# Patient Record
Sex: FEMALE | Race: WHITE | NOT HISPANIC OR LATINO | URBAN - METROPOLITAN AREA
[De-identification: names, ages, dates, MRNs, and addresses within clinical notes are randomized per-mention and may not be internally consistent; named-entity substitution may affect disease eponyms.]

---

## 2020-10-28 ENCOUNTER — OFFICE VISIT (OUTPATIENT)
Dept: URBAN - METROPOLITAN AREA CLINIC 41 | Facility: CLINIC | Age: 85
End: 2020-10-28
Payer: MEDICARE

## 2020-10-28 PROCEDURE — 67028 INJECTION EYE DRUG: CPT | Performed by: OPHTHALMOLOGY

## 2020-10-28 PROCEDURE — 92134 CPTRZ OPH DX IMG PST SGM RTA: CPT | Performed by: OPHTHALMOLOGY

## 2020-10-28 ASSESSMENT — INTRAOCULAR PRESSURE
OD: 10
OS: 9

## 2020-12-09 ENCOUNTER — OFFICE VISIT (OUTPATIENT)
Dept: URBAN - METROPOLITAN AREA CLINIC 41 | Facility: CLINIC | Age: 85
End: 2020-12-09
Payer: MEDICARE

## 2020-12-09 PROCEDURE — 67028 INJECTION EYE DRUG: CPT | Performed by: OPHTHALMOLOGY

## 2020-12-09 PROCEDURE — 92134 CPTRZ OPH DX IMG PST SGM RTA: CPT | Performed by: OPHTHALMOLOGY

## 2020-12-09 ASSESSMENT — INTRAOCULAR PRESSURE
OD: 18
OS: 13

## 2020-12-09 NOTE — IMPRESSION/PLAN
Impression: Exudative age-related macular degeneration, right eye, with active choroidal neovascularization: H35.3211. OD.
s/p Lucentis x 27, last OD 10/28/2020
s/p Avastin OD, last 11/2/17
last DFE OU 8/5/2020 Plan: Exam and OCT reveal stable SRF/PED s/p Lucentis 6 weeks ago. History of worsening at 7 weeks. I have recommended continuing Lucentis q6 weeks to maintain vision in better seeing eye. The diagnosis, natural history, and prognosis of exudative AMD, as well as the R/B/A of laser, PDT, and anti-VEGF were discussed. The patient understands that treatment may not improve vision but should reduce the risk of further visual loss. The patient elects to proceed with intravitreal Lucentis OD, which was performed in the office per protocol without complication.  

RTC 6 weeks, OCT OU re-eval, Lucentis, DFE OU (semiannual)

## 2021-01-20 ENCOUNTER — OFFICE VISIT (OUTPATIENT)
Dept: URBAN - METROPOLITAN AREA CLINIC 41 | Facility: CLINIC | Age: 86
End: 2021-01-20
Payer: MEDICARE

## 2021-01-20 DIAGNOSIS — H35.3211 EXUDATIVE AGE-RELATED MACULAR DEGENERATION, RIGHT EYE, WITH ACTIVE CHOROIDAL NEOVASCULARIZATION: Primary | ICD-10-CM

## 2021-01-20 PROCEDURE — 92134 CPTRZ OPH DX IMG PST SGM RTA: CPT | Performed by: OPHTHALMOLOGY

## 2021-01-20 PROCEDURE — 67028 INJECTION EYE DRUG: CPT | Performed by: OPHTHALMOLOGY

## 2021-01-20 PROCEDURE — 92014 COMPRE OPH EXAM EST PT 1/>: CPT | Performed by: OPHTHALMOLOGY

## 2021-01-20 ASSESSMENT — INTRAOCULAR PRESSURE
OD: 13
OS: 12

## 2021-01-20 NOTE — IMPRESSION/PLAN
Impression: Type 2 diabetes mellitus without complications: R80.1. OU. Plan: No evidence of retinopathy OU. The patient was advised to maintain tight BS, BP, and lipid control.  Last A1C unknown, Ok

## 2021-01-20 NOTE — IMPRESSION/PLAN
Impression: Exudative age-related macular degeneration, right eye, with active choroidal neovascularization: H35.3211. OD.
s/p Lucentis x 28, last OD 12/9/2020
s/p Avastin OD, last 11/2/17
last DFE OU 8/5/2020 Plan: Both eyes are due for full dilated semiannual exam today. Exam and OCT demonstrates stable SRF and PED s/p Lucentis 6 weeks ago. History of worsening at 7 weeks. I have recommended continuing Lucentis q6 weeks to maintain vision in the better eye. The diagnosis, natural history, and prognosis of exudative AMD, as well as the R/B/A of laser, PDT, and anti-VEGF were discussed. The patient understands that treatment may not improve vision but should reduce the risk of further visual loss. The patient elects to proceed with intravitreal Lucentis OD, which was performed in the office per protocol without complication.  

RTC 6 weeks, OCT OU re-eval, Lucentis 0.5

## 2021-01-20 NOTE — IMPRESSION/PLAN
Impression: Nexdtve age-related mclr degn, left eye, intermed dry stage: H35.3122. OS. Plan: Exam remains stable without evidence of progression, and the OCT does not show evidence of IRF/SRF/SRH. The patient was advised to continue AREDS-2 multivitamins. The patient was also asked to continue Amsler monitoring, avoid smoking, wear UV protection, and call us immediately with any visual changes.

## 2021-01-20 NOTE — IMPRESSION/PLAN
Impression: Corneal scar: H17.9. OS. Plan: h/o herpetic keratitis OS. Inactive.   Followed by Dr. Sin Brooks

## 2021-03-03 ENCOUNTER — OFFICE VISIT (OUTPATIENT)
Dept: URBAN - METROPOLITAN AREA CLINIC 41 | Facility: CLINIC | Age: 86
End: 2021-03-03
Payer: MEDICARE

## 2021-03-03 PROCEDURE — 67028 INJECTION EYE DRUG: CPT | Performed by: OPHTHALMOLOGY

## 2021-03-03 PROCEDURE — 92134 CPTRZ OPH DX IMG PST SGM RTA: CPT | Performed by: OPHTHALMOLOGY

## 2021-03-03 ASSESSMENT — INTRAOCULAR PRESSURE
OS: 10
OD: 12

## 2021-04-14 ENCOUNTER — OFFICE VISIT (OUTPATIENT)
Dept: URBAN - METROPOLITAN AREA CLINIC 41 | Facility: CLINIC | Age: 86
End: 2021-04-14
Payer: MEDICARE

## 2021-04-14 PROCEDURE — 67028 INJECTION EYE DRUG: CPT | Performed by: OPHTHALMOLOGY

## 2021-04-14 PROCEDURE — 92134 CPTRZ OPH DX IMG PST SGM RTA: CPT | Performed by: OPHTHALMOLOGY

## 2021-04-14 ASSESSMENT — INTRAOCULAR PRESSURE
OD: 15
OS: 10

## 2021-04-14 NOTE — IMPRESSION/PLAN
Impression: Exudative age-related macular degeneration, right eye, with active choroidal neovascularization: H35.3211. OD.
s/p Lucentis x 30, last OD 3/3/21
s/p Avastin OD, last 11/2/17
last DFE OU 1/20/21 Plan: Exam and OCT reveal moderate SRF, slightly worse s/p Lucentis 6 weeks ago. Stable PED. History of worsening at 7 weeks. I have recommended continuing Lucentis q6 weeks to maintain vision in her  better eye. The diagnosis, natural history, and prognosis of exudative AMD, as well as the R/B/A of laser, PDT, and anti-VEGF were discussed. The patient understands that treatment may not improve vision but should reduce the risk of further visual loss. The patient elects to proceed with intravitreal Lucentis OD, which was performed in the office per protocol without complication.  

RTC 6 weeks, OCT OU re-eval, Lucentis 0.5

## 2021-05-26 ENCOUNTER — OFFICE VISIT (OUTPATIENT)
Dept: URBAN - METROPOLITAN AREA CLINIC 41 | Facility: CLINIC | Age: 86
End: 2021-05-26
Payer: MEDICARE

## 2021-05-26 PROCEDURE — 92134 CPTRZ OPH DX IMG PST SGM RTA: CPT | Performed by: OPHTHALMOLOGY

## 2021-05-26 PROCEDURE — 67028 INJECTION EYE DRUG: CPT | Performed by: OPHTHALMOLOGY

## 2021-05-26 ASSESSMENT — INTRAOCULAR PRESSURE
OD: 12
OS: 16

## 2021-05-26 NOTE — IMPRESSION/PLAN
Impression: Exudative age-related macular degeneration, right eye, with active choroidal neovascularization: H35.3211. OD.
s/p Lucentis x 31, last OD 4/14/21
s/p Avastin OD, last 11/2/17
last DFE OU 1/20/21 Plan: Exam and OCT reveal moderate SRF, improved s/p Lucentis 6 weeks ago. Stable PED. History of worsening at 7 weeks. I have recommended continuing Lucentis q6 weeks to maintain vision in her  better eye. The diagnosis, natural history, and prognosis of exudative AMD, as well as the R/B/A of laser, PDT, and anti-VEGF were discussed. The patient understands that treatment may not improve vision but should reduce the risk of further visual loss. The patient elects to proceed with intravitreal Lucentis OD, which was performed in the office per protocol without complication.  

RTC 6 weeks, OCT OU re-eval, Lucentis 0.5, dilate OD only (per patient request)

## 2021-07-07 ENCOUNTER — OFFICE VISIT (OUTPATIENT)
Dept: URBAN - METROPOLITAN AREA CLINIC 41 | Facility: CLINIC | Age: 86
End: 2021-07-07
Payer: MEDICARE

## 2021-07-07 DIAGNOSIS — Z96.1 PRESENCE OF INTRAOCULAR LENS: ICD-10-CM

## 2021-07-07 DIAGNOSIS — E11.9 TYPE 2 DIABETES MELLITUS WITHOUT COMPLICATIONS: ICD-10-CM

## 2021-07-07 PROCEDURE — 92014 COMPRE OPH EXAM EST PT 1/>: CPT | Performed by: OPHTHALMOLOGY

## 2021-07-07 PROCEDURE — 67028 INJECTION EYE DRUG: CPT | Performed by: OPHTHALMOLOGY

## 2021-07-07 PROCEDURE — 92134 CPTRZ OPH DX IMG PST SGM RTA: CPT | Performed by: OPHTHALMOLOGY

## 2021-07-07 ASSESSMENT — INTRAOCULAR PRESSURE
OS: 14
OD: 13

## 2021-07-07 NOTE — IMPRESSION/PLAN
Impression: Exudative age-related macular degeneration, right eye, with active choroidal neovascularization: H35.3211. OD.
s/p Lucentis x 32, last OD 5/26/21
s/p Avastin OD, last 11/2/17
last DFE OU 1/20/21 Plan: The right eye is due for dilated semiannual exam today. Exam and OCT reveal persistent, moderate SRF, slightly increased s/p Lucentis 6 weeks ago. Stable PED. History of worsening at 7 weeks. I have recommended continuing Lucentis q6 weeks to maintain vision in her  better eye. The diagnosis, natural history, and prognosis of exudative AMD, as well as the R/B/A of laser, PDT, and anti-VEGF were discussed. The patient understands that treatment may not improve vision but should reduce the risk of further visual loss. The patient elects to proceed with intravitreal Lucentis OD, which was performed in the office per protocol without complication.  

RTC 6 weeks, OCT OU re-eval, Lucentis 0.5

## 2021-07-07 NOTE — IMPRESSION/PLAN
Impression: Type 2 diabetes mellitus without complications: X72.4. OU. Plan: No evidence of retinopathy OU. The patient was advised to maintain tight BS, BP, and lipid control. Last A1C unknown, under 7. 0

## 2021-07-07 NOTE — IMPRESSION/PLAN
Impression: Corneal scar: H17.9. OS. Plan: h/o herpetic keratitis OS. Inactive.   Followed by Dr. Robin Armijo

## 2021-08-18 ENCOUNTER — OFFICE VISIT (OUTPATIENT)
Dept: URBAN - METROPOLITAN AREA CLINIC 41 | Facility: CLINIC | Age: 86
End: 2021-08-18
Payer: MEDICARE

## 2021-08-18 PROCEDURE — 67028 INJECTION EYE DRUG: CPT | Performed by: OPHTHALMOLOGY

## 2021-08-18 PROCEDURE — 92134 CPTRZ OPH DX IMG PST SGM RTA: CPT | Performed by: OPHTHALMOLOGY

## 2021-08-18 ASSESSMENT — INTRAOCULAR PRESSURE
OD: 20
OS: 15

## 2021-08-18 NOTE — IMPRESSION/PLAN
Impression: Exudative age-related macular degeneration, right eye, with active choroidal neovascularization: H35.3211. OD.
s/p Lucentis x 33, last OD 7/7/21
s/p Avastin OD, last 11/2/17
last DFE OU 7/7/21 Plan: Exam and OCT show moderate SRF, slightly increased s/p Lucentis 6 weeks ago. Stable PED. History of worsening at 7 weeks. I have recommended continuing Lucentis q6 weeks to maintain vision in her  better eye. The diagnosis, natural history, and prognosis of exudative AMD, as well as the R/B/A of laser, PDT, and anti-VEGF were discussed. The patient understands that treatment may not improve vision but should reduce the risk of further visual loss. The patient elects to proceed with intravitreal Lucentis OD, which was performed in the office per protocol without complication.  

RTC 6 weeks, OCT OU re-eval, Lucentis 0.5

## 2021-09-30 ENCOUNTER — OFFICE VISIT (OUTPATIENT)
Dept: URBAN - METROPOLITAN AREA CLINIC 41 | Facility: CLINIC | Age: 86
End: 2021-09-30
Payer: MEDICARE

## 2021-09-30 PROCEDURE — 92134 CPTRZ OPH DX IMG PST SGM RTA: CPT | Performed by: OPHTHALMOLOGY

## 2021-09-30 PROCEDURE — 67028 INJECTION EYE DRUG: CPT | Performed by: OPHTHALMOLOGY

## 2021-09-30 ASSESSMENT — INTRAOCULAR PRESSURE
OD: 9
OS: 9

## 2021-09-30 NOTE — IMPRESSION/PLAN
Impression: Exudative age-related macular degeneration, right eye, with active choroidal neovascularization: H35.3211. OD.
s/p Lucentis x 34, last OD 8/18/21
s/p Avastin OD, last 11/2/17
last DFE OU 7/7/21 Plan: Exam and OCT show moderate SRF, stable s/p Lucentis 6 weeks ago. Stable PED. History of worsening at 7 weeks. I have recommended continuing Lucentis q6 weeks to maintain vision in her  better eye. The diagnosis, natural history, and prognosis of exudative AMD, as well as the R/B/A of laser, PDT, and anti-VEGF were discussed. The patient understands that treatment may not improve vision but should reduce the risk of further visual loss. The patient elects to proceed with intravitreal Lucentis OD, which was performed in the office per protocol without complication. If persistent exudation at follow-up, consider trial of Eylea. 

RTC 6 weeks, OCT OU re-eval, Lucentis 0.5 vs Eylea

## 2021-11-10 ENCOUNTER — OFFICE VISIT (OUTPATIENT)
Dept: URBAN - METROPOLITAN AREA CLINIC 41 | Facility: CLINIC | Age: 86
End: 2021-11-10
Payer: MEDICARE

## 2021-11-10 PROCEDURE — 92134 CPTRZ OPH DX IMG PST SGM RTA: CPT | Performed by: OPHTHALMOLOGY

## 2021-11-10 PROCEDURE — 67028 INJECTION EYE DRUG: CPT | Performed by: OPHTHALMOLOGY

## 2021-11-10 NOTE — IMPRESSION/PLAN
Impression: Exudative age-related macular degeneration, right eye, with active choroidal neovascularization: H35.3211. OD.
s/p Lucentis x 35, last OD 9/30/21
s/p Avastin OD, last 11/2/17
last DFE OU 7/7/21 Plan: Exam and OCT show persistent SRF, stable s/p Lucentis 6 weeks ago. Stable PED. History of worsening at 7 weeks. I have recommended trying a different anti-VEGF agent to maintain vision in her  better eye. The diagnosis, natural history, and prognosis of exudative AMD, as well as the R/B/A of laser, PDT, and anti-VEGF were discussed. The patient understands that treatment may not improve vision but should reduce the risk of further visual loss. The patient elects to proceed with intravitreal Eylea OD, which was performed in the office per protocol without complication. 

RTC 6 weeks, OCT OU re-eval, Lucentis 0.5 vs Eylea, DFE OU (semiannual)

## 2021-12-23 ENCOUNTER — OFFICE VISIT (OUTPATIENT)
Dept: URBAN - METROPOLITAN AREA CLINIC 41 | Facility: CLINIC | Age: 86
End: 2021-12-23
Payer: MEDICARE

## 2021-12-23 DIAGNOSIS — H43.813 VITREOUS DEGENERATION, BILATERAL: ICD-10-CM

## 2021-12-23 DIAGNOSIS — H35.3122 NEXDTVE AGE-RELATED MCLR DEGN, LEFT EYE, INTERMED DRY STAGE: ICD-10-CM

## 2021-12-23 DIAGNOSIS — H17.9 CORNEAL SCAR: ICD-10-CM

## 2021-12-23 PROCEDURE — 92134 CPTRZ OPH DX IMG PST SGM RTA: CPT | Performed by: OPHTHALMOLOGY

## 2021-12-23 PROCEDURE — 92014 COMPRE OPH EXAM EST PT 1/>: CPT | Performed by: OPHTHALMOLOGY

## 2021-12-23 PROCEDURE — 67028 INJECTION EYE DRUG: CPT | Performed by: OPHTHALMOLOGY

## 2021-12-23 ASSESSMENT — INTRAOCULAR PRESSURE
OS: 11
OD: 14

## 2021-12-23 NOTE — IMPRESSION/PLAN
Impression: Exudative age-related macular degeneration, right eye, with active choroidal neovascularization: H35.3211. OD.
s/p Eylea x 1, last 11/10/21
s/p Lucentis x 35, last OD 9/30/21
s/p Avastin OD, last 11/2/17
last DFE OU 7/7/21 Plan: Both eyes are due for full dilated semiannual exam today. Exam and OCT show improving SRF s/p Eylea 6 weeks ago. Stable PED. History of worsening at 7 weeks. I have recommended continuing Eylea to maintain vision in her  better eye. The diagnosis, natural history, and prognosis of exudative AMD, as well as the R/B/A of laser, PDT, and anti-VEGF were discussed. The patient understands that treatment may not improve vision but should reduce the risk of further visual loss. The patient elects to proceed with intravitreal Eylea OD #2, which was performed in the office per protocol without complication. 

RTC 6 weeks, OCT OU Eylea OD #3/3 (straight)

## 2021-12-23 NOTE — IMPRESSION/PLAN
Impression: Type 2 diabetes mellitus without complications: I62.4. OU. Plan: No evidence of retinopathy OU. The patient was advised to maintain tight BS, BP, and lipid control. Last A1C unknown, under 7. 0

## 2022-02-02 ENCOUNTER — PROCEDURE (OUTPATIENT)
Dept: URBAN - METROPOLITAN AREA CLINIC 41 | Facility: CLINIC | Age: 87
End: 2022-02-02
Payer: MEDICARE

## 2022-02-02 PROCEDURE — 92134 CPTRZ OPH DX IMG PST SGM RTA: CPT | Performed by: OPHTHALMOLOGY

## 2022-02-02 PROCEDURE — 67028 INJECTION EYE DRUG: CPT | Performed by: OPHTHALMOLOGY

## 2022-02-02 ASSESSMENT — INTRAOCULAR PRESSURE
OD: 12
OS: 10

## 2022-02-02 NOTE — IMPRESSION/PLAN
Impression: Exudative age-related macular degeneration, right eye, with active choroidal neovascularization: H35.3211. OD.
s/p Eylea x 2, last 12/23/21
s/p Lucentis x 35, last OD 9/30/21
s/p Avastin OD, last 11/2/17 Plan: OCT: stable SRF s/p Eylea 6 weeks ago. Stable PED. History of worsening at 7 weeks. I have recommended continuing Eylea to maintain vision in her  better eye. The diagnosis, natural history, and prognosis of exudative AMD, as well as the R/B/A of laser, PDT, and anti-VEGF were discussed. The patient understands that treatment may not improve vision but should reduce the risk of further visual loss. The patient elects to proceed with intravitreal Eylea OD #3, which was performed in the office per protocol without complication. 

RTC 6 weeks, OCT OU re-eval Eylea

## 2022-03-17 ENCOUNTER — OFFICE VISIT (OUTPATIENT)
Dept: URBAN - METROPOLITAN AREA CLINIC 41 | Facility: CLINIC | Age: 87
End: 2022-03-17
Payer: MEDICARE

## 2022-03-17 PROCEDURE — 67028 INJECTION EYE DRUG: CPT | Performed by: OPHTHALMOLOGY

## 2022-03-17 PROCEDURE — 92134 CPTRZ OPH DX IMG PST SGM RTA: CPT | Performed by: OPHTHALMOLOGY

## 2022-03-17 ASSESSMENT — INTRAOCULAR PRESSURE
OD: 10
OS: 11

## 2022-03-17 NOTE — IMPRESSION/PLAN
Impression: Exudative age-related macular degeneration, right eye, with active choroidal neovascularization: H35.3211. OD.
s/p Eylea x 3, last 2/2/22
s/p Lucentis x 35, last OD 9/30/21
s/p Avastin OD, last 11/2/17
last DFE OU 12/23/21 Plan: Exam and OCT: moderate, stable SRF overlying PED s/p Eylea 6 weeks ago. History of worsening at 7 weeks. I have recommended continuing Eylea to maintain vision in her  better eye. The diagnosis, natural history, and prognosis of exudative AMD, as well as the R/B/A of laser, PDT, and anti-VEGF were discussed. The patient understands that treatment may not improve vision but should reduce the risk of further visual loss. The patient elects to proceed with intravitreal Eylea OD #4, which was performed in the office per protocol without complication. 

RTC 6 weeks, OCT OD, Eylea OD #5 (straight)

## 2022-03-17 NOTE — IMPRESSION/PLAN
Impression: Type 2 diabetes mellitus without complications: J46.6. OU. Plan: No evidence of retinopathy OU. The patient was advised to maintain tight BS, BP, and lipid control. Last A1C unknown, under 7. 0

## 2022-03-17 NOTE — IMPRESSION/PLAN
Impression: Corneal scar: H17.9. OS. Plan: h/o herpetic keratitis OS. Inactive.   Followed by Dr. Gautam Miles

## 2022-04-28 ENCOUNTER — PROCEDURE (OUTPATIENT)
Dept: URBAN - METROPOLITAN AREA CLINIC 41 | Facility: CLINIC | Age: 87
End: 2022-04-28
Payer: MEDICARE

## 2022-04-28 DIAGNOSIS — H35.3211 EXUDATIVE AGE-RELATED MACULAR DEGENERATION, RIGHT EYE, WITH ACTIVE CHOROIDAL NEOVASCULARIZATION: Primary | ICD-10-CM

## 2022-04-28 PROCEDURE — 67028 INJECTION EYE DRUG: CPT | Performed by: OPHTHALMOLOGY

## 2022-04-28 PROCEDURE — 92134 CPTRZ OPH DX IMG PST SGM RTA: CPT | Performed by: OPHTHALMOLOGY

## 2022-04-28 ASSESSMENT — INTRAOCULAR PRESSURE
OS: 13
OD: 13

## 2022-04-28 NOTE — IMPRESSION/PLAN
Impression: Exudative age-related macular degeneration, right eye, with active choroidal neovascularization: H35.3211. OD.
s/p Eylea x 4, last 03/17/22
s/p Lucentis x 35, last OD 9/30/21
s/p Avastin OD, last 11/2/17
last DFE OU 12/23/21 Plan: OCT: improving SRF overlying PED s/p Eylea 6 weeks ago. History of worsening at 7 week interval.  I have recommended continuing Eylea to maintain vision in her better eye. The diagnosis, natural history, and prognosis of exudative AMD, as well as the R/B/A of laser, PDT, and anti-VEGF were discussed. The patient understands that treatment may not improve vision but should reduce the risk of further visual loss. The patient elects to switch back to Avastin or Lucentis 0.5 due to Eylea intolerance. Avastin is the only other treatment we have approval for. The patient elects to proceed with Avastin OD which was performed in the office per protocol without complication. 

RTC 6 weeks, OCT OU, re-eval for Lucentis vs Avastin, DFE OU

## 2022-06-09 ENCOUNTER — OFFICE VISIT (OUTPATIENT)
Dept: URBAN - METROPOLITAN AREA CLINIC 41 | Facility: CLINIC | Age: 87
End: 2022-06-09
Payer: MEDICARE

## 2022-06-09 DIAGNOSIS — H35.3211 EXUDATIVE AGE-RELATED MACULAR DEGENERATION, RIGHT EYE, WITH ACTIVE CHOROIDAL NEOVASCULARIZATION: Primary | ICD-10-CM

## 2022-06-09 DIAGNOSIS — E11.9 TYPE 2 DIABETES MELLITUS WITHOUT COMPLICATIONS: ICD-10-CM

## 2022-06-09 DIAGNOSIS — H43.813 VITREOUS DEGENERATION, BILATERAL: ICD-10-CM

## 2022-06-09 DIAGNOSIS — Z96.1 PRESENCE OF INTRAOCULAR LENS: ICD-10-CM

## 2022-06-09 DIAGNOSIS — H17.9 CORNEAL SCAR: ICD-10-CM

## 2022-06-09 DIAGNOSIS — H35.3122 NEXDTVE AGE-RELATED MCLR DEGN, LEFT EYE, INTERMED DRY STAGE: ICD-10-CM

## 2022-06-09 PROCEDURE — 92014 COMPRE OPH EXAM EST PT 1/>: CPT | Performed by: OPHTHALMOLOGY

## 2022-06-09 PROCEDURE — 67028 INJECTION EYE DRUG: CPT | Performed by: OPHTHALMOLOGY

## 2022-06-09 PROCEDURE — 92134 CPTRZ OPH DX IMG PST SGM RTA: CPT | Performed by: OPHTHALMOLOGY

## 2022-06-09 ASSESSMENT — INTRAOCULAR PRESSURE
OD: 10
OS: 7

## 2022-06-09 NOTE — IMPRESSION/PLAN
Impression: Exudative age-related macular degeneration, right eye, with active choroidal neovascularization: H35.3211. OD.
s/p Eylea x 4, last 03/17/22
s/p Lucentis x 35, last OD 9/30/21
s/p Avastin OD, last 4/28/22 Plan: Both eyes are due for full dilated exam today. Exam and OCT continue to demonstrate improving SRF overlying PED s/p Eylea 6 weeks ago. History of worsening at 7 week interval.  The diagnosis, natural history, and prognosis of exudative AMD, as well as the R/B/A of laser, PDT, and anti-VEGF were discussed. The patient understands that treatment may not improve vision but should reduce the risk of further visual loss. The patient elects to switch back to Lucentis 0.5 due to [facility] intolerance. Avastin is the only other treatment we have approval for. The patient elects to proceed with Lucentis 0.5 OD which was performed in the office per protocol without complication. 

RTC 6 weeks, OCT OU,  Lucentis OD #2/3

## 2022-06-09 NOTE — IMPRESSION/PLAN
Impression: Type 2 diabetes mellitus without complications: M06.0. OU. Plan: No evidence of retinopathy OU. The patient was advised to maintain tight BS, BP, and lipid control. Last A1C unknown, under 7. 0

## 2022-06-09 NOTE — IMPRESSION/PLAN
Impression: Corneal scar: H17.9. OS. Plan: h/o herpetic keratitis OS. Inactive.   Followed by Dr. Kb Watson

## 2022-07-21 ENCOUNTER — PROCEDURE (OUTPATIENT)
Dept: URBAN - METROPOLITAN AREA CLINIC 41 | Facility: CLINIC | Age: 87
End: 2022-07-21
Payer: MEDICARE

## 2022-07-21 DIAGNOSIS — H35.3211 EXUDATIVE AGE-RELATED MACULAR DEGENERATION, RIGHT EYE, WITH ACTIVE CHOROIDAL NEOVASCULARIZATION: Primary | ICD-10-CM

## 2022-07-21 PROCEDURE — 92134 CPTRZ OPH DX IMG PST SGM RTA: CPT | Performed by: OPHTHALMOLOGY

## 2022-07-21 PROCEDURE — 67028 INJECTION EYE DRUG: CPT | Performed by: OPHTHALMOLOGY

## 2022-07-21 ASSESSMENT — INTRAOCULAR PRESSURE
OS: 8
OD: 11

## 2022-07-21 NOTE — IMPRESSION/PLAN
Impression: Exudative age-related macular degeneration, right eye, with active choroidal neovascularization: H35.3211. OD.
s/p Eylea x 4, last 03/17/22
s/p Lucentis x 36, last OD 06/09/22
s/p Avastin OD, last 4/28/22 Plan: OCT: stable, moderate SRF overlying PED s/p Lucentis 6 weeks ago. History of worsening at 7 week interval.  The diagnosis, natural history, and prognosis of exudative AMD, as well as the R/B/A of laser, PDT, and anti-VEGF were discussed. The patient understands that treatment may not improve vision but should reduce the risk of further visual loss. The patient elects to continue Lucentis 0.5 due to Eylea intolerance. Lucentis 0.5 OD was performed in the office per protocol without complication. 

RTC 6 weeks, OCT OU,  Lucentis OD #3/3 straight

## 2022-08-31 ENCOUNTER — PROCEDURE (OUTPATIENT)
Dept: URBAN - METROPOLITAN AREA CLINIC 41 | Facility: CLINIC | Age: 87
End: 2022-08-31
Payer: MEDICARE

## 2022-08-31 DIAGNOSIS — H35.3211 EXUDATIVE AGE-RELATED MACULAR DEGENERATION, RIGHT EYE, WITH ACTIVE CHOROIDAL NEOVASCULARIZATION: Primary | ICD-10-CM

## 2022-08-31 PROCEDURE — 92134 CPTRZ OPH DX IMG PST SGM RTA: CPT | Performed by: OPHTHALMOLOGY

## 2022-08-31 PROCEDURE — 67028 INJECTION EYE DRUG: CPT | Performed by: OPHTHALMOLOGY

## 2022-08-31 ASSESSMENT — INTRAOCULAR PRESSURE
OS: 15
OD: 18

## 2022-10-12 ENCOUNTER — OFFICE VISIT (OUTPATIENT)
Dept: URBAN - METROPOLITAN AREA CLINIC 41 | Facility: CLINIC | Age: 87
End: 2022-10-12
Payer: MEDICARE

## 2022-10-12 DIAGNOSIS — H35.3122 NEXDTVE AGE-RELATED MCLR DEGN, LEFT EYE, INTERMED DRY STAGE: ICD-10-CM

## 2022-10-12 DIAGNOSIS — Z96.1 PRESENCE OF INTRAOCULAR LENS: ICD-10-CM

## 2022-10-12 DIAGNOSIS — H35.3211 EXUDATIVE AGE-RELATED MACULAR DEGENERATION, RIGHT EYE, WITH ACTIVE CHOROIDAL NEOVASCULARIZATION: Primary | ICD-10-CM

## 2022-10-12 DIAGNOSIS — H17.9 CORNEAL SCAR: ICD-10-CM

## 2022-10-12 DIAGNOSIS — E11.9 TYPE 2 DIABETES MELLITUS WITHOUT COMPLICATIONS: ICD-10-CM

## 2022-10-12 DIAGNOSIS — H43.813 VITREOUS DEGENERATION, BILATERAL: ICD-10-CM

## 2022-10-12 PROCEDURE — 92134 CPTRZ OPH DX IMG PST SGM RTA: CPT | Performed by: OPHTHALMOLOGY

## 2022-10-12 PROCEDURE — 92014 COMPRE OPH EXAM EST PT 1/>: CPT | Performed by: OPHTHALMOLOGY

## 2022-10-12 PROCEDURE — 67028 INJECTION EYE DRUG: CPT | Performed by: OPHTHALMOLOGY

## 2022-10-12 ASSESSMENT — INTRAOCULAR PRESSURE
OS: 11
OD: 22

## 2022-10-12 NOTE — IMPRESSION/PLAN
Impression: Exudative age-related macular degeneration, right eye, with active choroidal neovascularization: H35.3211. OD.
s/p Eylea x 4, last 03/17/22
s/p Lucentis x 38, last OD 08/31/22
s/p Avastin OD, last 4/28/22 Plan: Exam/OCT reveal PED with moderate SRF, remains stable s/p Lucentis 6 weeks ago. History of worsening at 7 week interval.  The diagnosis, natural history, and prognosis of exudative AMD, as well as the R/B/A of laser, PDT, and anti-VEGF were discussed. The patient understands that treatment may not improve vision but should reduce the risk of further visual loss. The patient elects to continue Lucentis 0.5 due to Eylea intolerance. Lucentis 0.5 OD was performed in the office per protocol without complication. 

RTC 6 weeks, OCT OU, Lucentis OD #2/3 straight

## 2022-10-12 NOTE — IMPRESSION/PLAN
Impression: Type 2 diabetes mellitus without complications: A13.9. OU. Plan: No evidence of retinopathy OU. The patient was advised to maintain tight BS, BP, and lipid control. Last A1C unknown, under 7. 0

## 2022-10-12 NOTE — IMPRESSION/PLAN
Impression: Corneal scar: H17.9. OS. Plan: h/o herpetic keratitis OS. Inactive.   Followed by Dr. Nayla Diehl

## 2022-11-30 ENCOUNTER — OFFICE VISIT (OUTPATIENT)
Dept: URBAN - METROPOLITAN AREA CLINIC 41 | Facility: CLINIC | Age: 87
End: 2022-11-30
Payer: MEDICARE

## 2022-11-30 DIAGNOSIS — H35.3211 EXUDATIVE AGE-RELATED MACULAR DEGENERATION, RIGHT EYE, WITH ACTIVE CHOROIDAL NEOVASCULARIZATION: Primary | ICD-10-CM

## 2022-11-30 PROCEDURE — 92134 CPTRZ OPH DX IMG PST SGM RTA: CPT | Performed by: OPHTHALMOLOGY

## 2022-11-30 PROCEDURE — 67028 INJECTION EYE DRUG: CPT | Performed by: OPHTHALMOLOGY

## 2022-11-30 ASSESSMENT — INTRAOCULAR PRESSURE
OD: 16
OS: 9

## 2022-11-30 NOTE — IMPRESSION/PLAN
Impression: Exudative age-related macular degeneration, right eye, with active choroidal neovascularization: H35.3211. OD.
s/p Eylea x 4, last 03/17/22
s/p Lucentis x 39, last OD 10/12/22
s/p Avastin OD, last 4/28/22 Plan: OCT: moderate SRF, slightly worse s/p Lucentis 7 weeks ago. History of worsening at 7 week interval.  The diagnosis, natural history, and prognosis of exudative AMD, as well as the R/B/A of laser, PDT, and anti-VEGF were discussed. The patient understands that treatment may not improve vision but should reduce the risk of further visual loss. The patient elects to continue Lucentis 0.5 due to Eylea intolerance. Lucentis 0.5 OD was performed in the office per protocol without complication. 

RTC 6 weeks, OCT OU, Lucentis OD #3/3 straight

## 2023-01-11 ENCOUNTER — PROCEDURE (OUTPATIENT)
Dept: URBAN - METROPOLITAN AREA CLINIC 41 | Facility: CLINIC | Age: 88
End: 2023-01-11
Payer: MEDICARE

## 2023-01-11 DIAGNOSIS — H35.3211 EXUDATIVE AGE-RELATED MACULAR DEGENERATION, RIGHT EYE, WITH ACTIVE CHOROIDAL NEOVASCULARIZATION: Primary | ICD-10-CM

## 2023-01-11 PROCEDURE — 67028 INJECTION EYE DRUG: CPT | Performed by: OPHTHALMOLOGY

## 2023-01-11 PROCEDURE — 92134 CPTRZ OPH DX IMG PST SGM RTA: CPT | Performed by: OPHTHALMOLOGY

## 2023-01-11 ASSESSMENT — INTRAOCULAR PRESSURE
OD: 17
OS: 13

## 2023-01-11 NOTE — IMPRESSION/PLAN
Impression: Exudative age-related macular degeneration, right eye, with active choroidal neovascularization: H35.3211. OD.
s/p Eylea x 4, last 03/17/22
s/p Lucentis x 40, last OD 11/30/22
s/p Avastin OD, last 4/28/22 Plan: OCT: moderate SRF, improved s/p Lucentis 6 weeks ago. History of worsening at 7 week interval.  The diagnosis, natural history, and prognosis of exudative AMD, as well as the R/B/A of laser, PDT, and anti-VEGF were discussed. The patient understands that treatment may not improve vision but should reduce the risk of further visual loss. The patient elects to continue Lucentis 0.5 due to Eylea intolerance. Lucentis 0.5 OD was performed in the office per protocol without complication. 

RTC 6 weeks, OCT OU, re-eval for Lucentis OD

## 2023-02-15 ENCOUNTER — OFFICE VISIT (OUTPATIENT)
Dept: URBAN - METROPOLITAN AREA CLINIC 41 | Facility: CLINIC | Age: 88
End: 2023-02-15
Payer: MEDICARE

## 2023-02-15 DIAGNOSIS — H35.3122 NEXDTVE AGE-RELATED MCLR DEGN, LEFT EYE, INTERMED DRY STAGE: ICD-10-CM

## 2023-02-15 DIAGNOSIS — H17.9 CORNEAL SCAR: ICD-10-CM

## 2023-02-15 DIAGNOSIS — H35.3211 EXUDATIVE AGE-RELATED MACULAR DEGENERATION, RIGHT EYE, WITH ACTIVE CHOROIDAL NEOVASCULARIZATION: Primary | ICD-10-CM

## 2023-02-15 DIAGNOSIS — Z96.1 PRESENCE OF INTRAOCULAR LENS: ICD-10-CM

## 2023-02-15 DIAGNOSIS — E11.9 TYPE 2 DIABETES MELLITUS WITHOUT COMPLICATIONS: ICD-10-CM

## 2023-02-15 DIAGNOSIS — H43.813 VITREOUS DEGENERATION, BILATERAL: ICD-10-CM

## 2023-02-15 PROCEDURE — 92134 CPTRZ OPH DX IMG PST SGM RTA: CPT | Performed by: OPHTHALMOLOGY

## 2023-02-15 PROCEDURE — 67028 INJECTION EYE DRUG: CPT | Performed by: OPHTHALMOLOGY

## 2023-02-15 PROCEDURE — 92014 COMPRE OPH EXAM EST PT 1/>: CPT | Performed by: OPHTHALMOLOGY

## 2023-02-15 ASSESSMENT — INTRAOCULAR PRESSURE
OD: 13
OS: 10

## 2023-02-15 NOTE — IMPRESSION/PLAN
Impression: Corneal scar: H17.9. OS. Plan: h/o herpetic keratitis OS. Inactive.   Followed by Dr. Prateek Navarro

## 2023-02-15 NOTE — IMPRESSION/PLAN
Impression: Exudative age-related macular degeneration, right eye, with active choroidal neovascularization: H35.3211. OD.
s/p Eylea x 4, last 03/17/22
s/p Lucentis x 41, last OD 01/11/23
s/p Avastin OD, last 4/28/22 Plan: Exam and OCT reveal improving SRF s/p Lucentis 5 weeks ago. History of worsening at 7 week interval.  The diagnosis, natural history, and prognosis of exudative AMD, as well as the R/B/A of laser, PDT, and anti-VEGF were discussed. The patient understands that treatment may not improve vision but should reduce the risk of further visual loss. The patient elects to continue Lucentis 0.5 due to Eylea intolerance. Lucentis 0.5 OD was performed in the office per protocol without complication. 

RTC 6 weeks, OCT OU, Lucentis OD #2/3 straight

## 2023-02-15 NOTE — IMPRESSION/PLAN
Impression: Type 2 diabetes mellitus without complications: N64.6. OU. Plan: No evidence of retinopathy OU. The patient was advised to maintain tight BS, BP, and lipid control.  Last A1C unknown

## 2023-03-29 ENCOUNTER — PROCEDURE (OUTPATIENT)
Dept: URBAN - METROPOLITAN AREA CLINIC 41 | Facility: CLINIC | Age: 88
End: 2023-03-29
Payer: MEDICARE

## 2023-03-29 DIAGNOSIS — H35.3211 EXUDATIVE AGE-RELATED MACULAR DEGENERATION, RIGHT EYE, WITH ACTIVE CHOROIDAL NEOVASCULARIZATION: Primary | ICD-10-CM

## 2023-03-29 PROCEDURE — 92134 CPTRZ OPH DX IMG PST SGM RTA: CPT | Performed by: OPHTHALMOLOGY

## 2023-03-29 PROCEDURE — 67028 INJECTION EYE DRUG: CPT | Performed by: OPHTHALMOLOGY

## 2023-03-29 ASSESSMENT — INTRAOCULAR PRESSURE
OD: 13
OS: 10

## 2023-03-29 NOTE — IMPRESSION/PLAN
Impression: Exudative age-related macular degeneration, right eye, with active choroidal neovascularization: H35.3211. OD.
s/p Eylea x 4, last 03/17/22
s/p Lucentis x 42, last OD 02/15/23
s/p Avastin OD, last 4/28/22 Plan: OCT: PED with stable SRF s/p Lucentis 6 weeks ago. History of worsening at 7 week interval.  The diagnosis, natural history, and prognosis of exudative AMD, as well as the R/B/A of laser, PDT, and anti-VEGF were discussed. The patient understands that treatment may not improve vision but should reduce the risk of further visual loss. The patient elects to continue Lucentis 0.5 due to Eylea intolerance. Lucentis 0.5 OD was performed in the office per protocol without complication. 

RTC 6 weeks, OCT OU, Lucentis OD #3/3 straight

## 2023-05-10 ENCOUNTER — PROCEDURE (OUTPATIENT)
Dept: URBAN - METROPOLITAN AREA CLINIC 41 | Facility: CLINIC | Age: 88
End: 2023-05-10
Payer: MEDICARE

## 2023-05-10 DIAGNOSIS — H35.3211 EXUDATIVE AGE-RELATED MACULAR DEGENERATION, RIGHT EYE, WITH ACTIVE CHOROIDAL NEOVASCULARIZATION: Primary | ICD-10-CM

## 2023-05-10 PROCEDURE — 67028 INJECTION EYE DRUG: CPT | Performed by: OPHTHALMOLOGY

## 2023-05-10 PROCEDURE — 92134 CPTRZ OPH DX IMG PST SGM RTA: CPT | Performed by: OPHTHALMOLOGY

## 2023-05-10 ASSESSMENT — INTRAOCULAR PRESSURE
OD: 12
OS: 8

## 2023-05-10 NOTE — IMPRESSION/PLAN
Impression: Exudative age-related macular degeneration, right eye, with active choroidal neovascularization: H35.3211. OD.
s/p Eylea x 4, last 03/17/22
s/p Lucentis x 43, last OD 03/29/23
s/p Avastin OD, last 4/28/22 Plan: OCT: PED with persistent, moderate SRF s/p Lucentis 6 weeks ago. History of worsening at 7 week interval.  The diagnosis, natural history, and prognosis of exudative AMD, as well as the R/B/A of laser, PDT, and anti-VEGF were discussed. The patient understands that treatment may not improve vision but should reduce the risk of further visual loss. The patient elects to continue Lucentis 0.5 due to Eylea intolerance. Lucentis 0.5 OD was performed in the office per protocol without complication. Pt will consider a switch to Vabysmo in the future to help reduce treatment burden. 

RTC 6 weeks, OCT OU, re-eval Lucentis vs Vabysmo OD

## 2023-06-22 ENCOUNTER — OFFICE VISIT (OUTPATIENT)
Dept: URBAN - METROPOLITAN AREA CLINIC 41 | Facility: CLINIC | Age: 88
End: 2023-06-22
Payer: MEDICARE

## 2023-06-22 DIAGNOSIS — H35.3211 EXUDATIVE AGE-RELATED MACULAR DEGENERATION, RIGHT EYE, WITH ACTIVE CHOROIDAL NEOVASCULARIZATION: Primary | ICD-10-CM

## 2023-06-22 DIAGNOSIS — E11.9 TYPE 2 DIABETES MELLITUS WITHOUT COMPLICATIONS: ICD-10-CM

## 2023-06-22 DIAGNOSIS — H43.813 VITREOUS DEGENERATION, BILATERAL: ICD-10-CM

## 2023-06-22 DIAGNOSIS — H35.3122 NEXDTVE AGE-RELATED MCLR DEGN, LEFT EYE, INTERMED DRY STAGE: ICD-10-CM

## 2023-06-22 DIAGNOSIS — Z96.1 PRESENCE OF INTRAOCULAR LENS: ICD-10-CM

## 2023-06-22 PROCEDURE — 92134 CPTRZ OPH DX IMG PST SGM RTA: CPT | Performed by: OPHTHALMOLOGY

## 2023-06-22 PROCEDURE — 92014 COMPRE OPH EXAM EST PT 1/>: CPT | Performed by: OPHTHALMOLOGY

## 2023-06-22 PROCEDURE — 67028 INJECTION EYE DRUG: CPT | Performed by: OPHTHALMOLOGY

## 2023-06-22 ASSESSMENT — INTRAOCULAR PRESSURE
OS: 10
OD: 13

## 2023-06-22 NOTE — IMPRESSION/PLAN
Impression: Exudative age-related macular degeneration, right eye, with active choroidal neovascularization: H35.3211. OD.
s/p Eylea x 4, last 03/17/22
s/p Lucentis x 44, last OD 05/10/23
s/p Avastin OD, last 4/28/22 Plan: Exam and OCT reveal PED with moderate, stable SRF s/p Lucentis 6 weeks ago. History of worsening at 7 week interval.  The diagnosis, natural history, and prognosis of exudative AMD, as well as the R/B/A of laser, PDT, and anti-VEGF were discussed. The patient understands that treatment may not improve vision but should reduce the risk of further visual loss. The patient elects to continue Lucentis 0.5 due to Eylea intolerance. Lucentis 0.5 OD was performed in the office per protocol without complication. Pt will consider a switch to Vabysmo in the future to help reduce treatment burden. 

RTC 6 weeks, OCT OU Lucentis vs Vabysmo OD #2/3 straight

## 2023-06-22 NOTE — IMPRESSION/PLAN
Impression: Type 2 diabetes mellitus without complications: O42.8. OU. Plan: No evidence of retinopathy OU. The patient was advised to maintain tight BS, BP, and lipid control.  Last A1C unknown

## 2023-08-03 ENCOUNTER — PROCEDURE (OUTPATIENT)
Dept: URBAN - METROPOLITAN AREA CLINIC 41 | Facility: CLINIC | Age: 88
End: 2023-08-03
Payer: MEDICARE

## 2023-08-03 DIAGNOSIS — H35.3211 EXUDATIVE AGE-RELATED MACULAR DEGENERATION, RIGHT EYE, WITH ACTIVE CHOROIDAL NEOVASCULARIZATION: Primary | ICD-10-CM

## 2023-08-03 PROCEDURE — 92134 CPTRZ OPH DX IMG PST SGM RTA: CPT | Performed by: OPHTHALMOLOGY

## 2023-08-03 PROCEDURE — 67028 INJECTION EYE DRUG: CPT | Performed by: OPHTHALMOLOGY

## 2023-08-03 ASSESSMENT — INTRAOCULAR PRESSURE
OS: 14
OD: 16

## 2023-09-14 ENCOUNTER — PROCEDURE (OUTPATIENT)
Dept: URBAN - METROPOLITAN AREA CLINIC 41 | Facility: CLINIC | Age: 88
End: 2023-09-14
Payer: MEDICARE

## 2023-09-14 DIAGNOSIS — H35.3211 EXUDATIVE AGE-RELATED MACULAR DEGENERATION, RIGHT EYE, WITH ACTIVE CHOROIDAL NEOVASCULARIZATION: Primary | ICD-10-CM

## 2023-09-14 PROCEDURE — 67028 INJECTION EYE DRUG: CPT | Performed by: OPHTHALMOLOGY

## 2023-09-14 PROCEDURE — 92134 CPTRZ OPH DX IMG PST SGM RTA: CPT | Performed by: OPHTHALMOLOGY

## 2023-09-14 ASSESSMENT — INTRAOCULAR PRESSURE
OS: 14
OD: 12

## 2023-10-26 ENCOUNTER — OFFICE VISIT (OUTPATIENT)
Dept: URBAN - METROPOLITAN AREA CLINIC 41 | Facility: CLINIC | Age: 88
End: 2023-10-26
Payer: MEDICARE

## 2023-10-26 DIAGNOSIS — H35.3211 EXUDATIVE AGE-RELATED MACULAR DEGENERATION, RIGHT EYE, WITH ACTIVE CHOROIDAL NEOVASCULARIZATION: Primary | ICD-10-CM

## 2023-10-26 DIAGNOSIS — E11.9 TYPE 2 DIABETES MELLITUS WITHOUT COMPLICATIONS: ICD-10-CM

## 2023-10-26 DIAGNOSIS — H35.3122 NEXDTVE AGE-RELATED MCLR DEGN, LEFT EYE, INTERMED DRY STAGE: ICD-10-CM

## 2023-10-26 DIAGNOSIS — H43.813 VITREOUS DEGENERATION, BILATERAL: ICD-10-CM

## 2023-10-26 DIAGNOSIS — Z96.1 PRESENCE OF INTRAOCULAR LENS: ICD-10-CM

## 2023-10-26 PROCEDURE — 92014 COMPRE OPH EXAM EST PT 1/>: CPT | Performed by: OPHTHALMOLOGY

## 2023-10-26 PROCEDURE — 67028 INJECTION EYE DRUG: CPT | Performed by: OPHTHALMOLOGY

## 2023-10-26 PROCEDURE — 92134 CPTRZ OPH DX IMG PST SGM RTA: CPT | Performed by: OPHTHALMOLOGY

## 2023-10-26 ASSESSMENT — INTRAOCULAR PRESSURE
OD: 16
OS: 12

## 2023-12-07 ENCOUNTER — OFFICE VISIT (OUTPATIENT)
Dept: URBAN - METROPOLITAN AREA CLINIC 41 | Facility: CLINIC | Age: 88
End: 2023-12-07
Payer: MEDICARE

## 2023-12-07 PROCEDURE — 92134 CPTRZ OPH DX IMG PST SGM RTA: CPT | Performed by: OPHTHALMOLOGY

## 2023-12-07 PROCEDURE — 67028 INJECTION EYE DRUG: CPT | Performed by: OPHTHALMOLOGY

## 2023-12-07 ASSESSMENT — INTRAOCULAR PRESSURE
OD: 11
OS: 7

## 2024-01-18 ENCOUNTER — OFFICE VISIT (OUTPATIENT)
Dept: URBAN - METROPOLITAN AREA CLINIC 41 | Facility: CLINIC | Age: 89
End: 2024-01-18
Payer: MEDICARE

## 2024-01-18 DIAGNOSIS — H35.3211 EXUDATIVE AGE-RELATED MACULAR DEGENERATION, RIGHT EYE, WITH ACTIVE CHOROIDAL NEOVASCULARIZATION: Primary | ICD-10-CM

## 2024-01-18 PROCEDURE — 67028 INJECTION EYE DRUG: CPT | Performed by: OPHTHALMOLOGY

## 2024-01-18 PROCEDURE — 92134 CPTRZ OPH DX IMG PST SGM RTA: CPT | Performed by: OPHTHALMOLOGY

## 2024-01-18 ASSESSMENT — INTRAOCULAR PRESSURE
OD: 16
OS: 15

## 2024-02-27 ENCOUNTER — OFFICE VISIT (OUTPATIENT)
Dept: URBAN - METROPOLITAN AREA CLINIC 41 | Facility: CLINIC | Age: 89
End: 2024-02-27
Payer: MEDICARE

## 2024-02-27 DIAGNOSIS — H35.3122 NEXDTVE AGE-RELATED MCLR DEGN, LEFT EYE, INTERMED DRY STAGE: ICD-10-CM

## 2024-02-27 DIAGNOSIS — H43.813 VITREOUS DEGENERATION, BILATERAL: ICD-10-CM

## 2024-02-27 DIAGNOSIS — H35.3211 EXUDATIVE AGE-RELATED MACULAR DEGENERATION, RIGHT EYE, WITH ACTIVE CHOROIDAL NEOVASCULARIZATION: Primary | ICD-10-CM

## 2024-02-27 PROCEDURE — 67028 INJECTION EYE DRUG: CPT | Performed by: OPHTHALMOLOGY

## 2024-02-27 PROCEDURE — 92014 COMPRE OPH EXAM EST PT 1/>: CPT | Performed by: OPHTHALMOLOGY

## 2024-02-27 PROCEDURE — 92134 CPTRZ OPH DX IMG PST SGM RTA: CPT | Performed by: OPHTHALMOLOGY

## 2024-02-27 ASSESSMENT — INTRAOCULAR PRESSURE
OS: 9
OD: 12

## 2024-04-11 ENCOUNTER — OFFICE VISIT (OUTPATIENT)
Dept: URBAN - METROPOLITAN AREA CLINIC 41 | Facility: CLINIC | Age: 89
End: 2024-04-11
Payer: MEDICARE

## 2024-04-11 DIAGNOSIS — H35.3211 EXUDATIVE AGE-RELATED MACULAR DEGENERATION, RIGHT EYE, WITH ACTIVE CHOROIDAL NEOVASCULARIZATION: Primary | ICD-10-CM

## 2024-04-11 PROCEDURE — 92134 CPTRZ OPH DX IMG PST SGM RTA: CPT | Performed by: OPHTHALMOLOGY

## 2024-04-11 PROCEDURE — 67028 INJECTION EYE DRUG: CPT | Performed by: OPHTHALMOLOGY

## 2024-04-11 ASSESSMENT — INTRAOCULAR PRESSURE
OS: 14
OD: 17

## 2024-05-23 ENCOUNTER — OFFICE VISIT (OUTPATIENT)
Dept: URBAN - METROPOLITAN AREA CLINIC 41 | Facility: CLINIC | Age: 89
End: 2024-05-23
Payer: MEDICARE

## 2024-05-23 DIAGNOSIS — H35.3211 EXUDATIVE AGE-RELATED MACULAR DEGENERATION, RIGHT EYE, WITH ACTIVE CHOROIDAL NEOVASCULARIZATION: Primary | ICD-10-CM

## 2024-05-23 PROCEDURE — 92134 CPTRZ OPH DX IMG PST SGM RTA: CPT | Performed by: OPHTHALMOLOGY

## 2024-05-23 PROCEDURE — 67028 INJECTION EYE DRUG: CPT | Performed by: OPHTHALMOLOGY

## 2024-05-23 ASSESSMENT — INTRAOCULAR PRESSURE
OD: 15
OS: 11

## 2024-07-11 ENCOUNTER — OFFICE VISIT (OUTPATIENT)
Dept: URBAN - METROPOLITAN AREA CLINIC 27 | Facility: CLINIC | Age: 89
End: 2024-07-11
Payer: MEDICARE

## 2024-07-11 DIAGNOSIS — H35.3122 NEXDTVE AGE-RELATED MCLR DEGN, LEFT EYE, INTERMED DRY STAGE: ICD-10-CM

## 2024-07-11 DIAGNOSIS — H43.813 VITREOUS DEGENERATION, BILATERAL: ICD-10-CM

## 2024-07-11 DIAGNOSIS — E11.9 TYPE 2 DIABETES MELLITUS WITHOUT COMPLICATIONS: ICD-10-CM

## 2024-07-11 DIAGNOSIS — Z96.1 PRESENCE OF INTRAOCULAR LENS: ICD-10-CM

## 2024-07-11 DIAGNOSIS — H17.9 CORNEAL SCAR: ICD-10-CM

## 2024-07-11 DIAGNOSIS — H35.3211 EXUDATIVE AGE-RELATED MACULAR DEGENERATION, RIGHT EYE, WITH ACTIVE CHOROIDAL NEOVASCULARIZATION: Primary | ICD-10-CM

## 2024-07-11 PROCEDURE — 67028 INJECTION EYE DRUG: CPT | Performed by: OPHTHALMOLOGY

## 2024-07-11 PROCEDURE — 92134 CPTRZ OPH DX IMG PST SGM RTA: CPT | Performed by: OPHTHALMOLOGY

## 2024-07-11 ASSESSMENT — INTRAOCULAR PRESSURE
OD: 16
OS: 12

## 2024-08-29 ENCOUNTER — OFFICE VISIT (OUTPATIENT)
Dept: URBAN - METROPOLITAN AREA CLINIC 41 | Facility: CLINIC | Age: 89
End: 2024-08-29
Payer: MEDICARE

## 2024-08-29 DIAGNOSIS — H35.3211 EXUDATIVE AGE-RELATED MACULAR DEGENERATION, RIGHT EYE, WITH ACTIVE CHOROIDAL NEOVASCULARIZATION: Primary | ICD-10-CM

## 2024-08-29 PROCEDURE — 67028 INJECTION EYE DRUG: CPT | Performed by: OPHTHALMOLOGY

## 2024-08-29 PROCEDURE — 92134 CPTRZ OPH DX IMG PST SGM RTA: CPT | Performed by: OPHTHALMOLOGY

## 2024-08-29 ASSESSMENT — INTRAOCULAR PRESSURE
OS: 8
OD: 12

## 2024-11-21 ENCOUNTER — OFFICE VISIT (OUTPATIENT)
Dept: URBAN - METROPOLITAN AREA CLINIC 41 | Facility: CLINIC | Age: 89
End: 2024-11-21
Payer: MEDICARE

## 2024-11-21 DIAGNOSIS — H35.3211 EXUDATIVE AGE-RELATED MACULAR DEGENERATION, RIGHT EYE, WITH ACTIVE CHOROIDAL NEOVASCULARIZATION: Primary | ICD-10-CM

## 2024-11-21 PROCEDURE — 92134 CPTRZ OPH DX IMG PST SGM RTA: CPT | Performed by: OPHTHALMOLOGY

## 2024-11-21 PROCEDURE — 67028 INJECTION EYE DRUG: CPT | Performed by: OPHTHALMOLOGY

## 2024-11-21 ASSESSMENT — INTRAOCULAR PRESSURE
OD: 16
OS: 14

## 2025-02-04 ENCOUNTER — OFFICE VISIT (OUTPATIENT)
Dept: URBAN - METROPOLITAN AREA CLINIC 27 | Facility: CLINIC | Age: OVER 89
End: 2025-02-04
Payer: MEDICARE

## 2025-02-04 DIAGNOSIS — H17.9 CORNEAL SCAR: ICD-10-CM

## 2025-02-04 DIAGNOSIS — E11.9 TYPE 2 DIABETES MELLITUS WITHOUT COMPLICATIONS: ICD-10-CM

## 2025-02-04 DIAGNOSIS — H35.3122 NEXDTVE AGE-RELATED MCLR DEGN, LEFT EYE, INTERMED DRY STAGE: ICD-10-CM

## 2025-02-04 DIAGNOSIS — Z96.1 PRESENCE OF INTRAOCULAR LENS: ICD-10-CM

## 2025-02-04 DIAGNOSIS — H35.3211 EXUDATIVE AGE-RELATED MACULAR DEGENERATION, RIGHT EYE, WITH ACTIVE CHOROIDAL NEOVASCULARIZATION: Primary | ICD-10-CM

## 2025-02-04 DIAGNOSIS — H43.813 VITREOUS DEGENERATION, BILATERAL: ICD-10-CM

## 2025-02-04 PROCEDURE — 99214 OFFICE O/P EST MOD 30 MIN: CPT | Performed by: OPHTHALMOLOGY

## 2025-02-04 PROCEDURE — 67028 INJECTION EYE DRUG: CPT | Performed by: OPHTHALMOLOGY

## 2025-02-04 PROCEDURE — 92134 CPTRZ OPH DX IMG PST SGM RTA: CPT | Performed by: OPHTHALMOLOGY

## 2025-02-04 ASSESSMENT — INTRAOCULAR PRESSURE
OS: 10
OD: 14